# Patient Record
Sex: FEMALE | Race: WHITE
[De-identification: names, ages, dates, MRNs, and addresses within clinical notes are randomized per-mention and may not be internally consistent; named-entity substitution may affect disease eponyms.]

---

## 2018-04-14 ENCOUNTER — HOSPITAL ENCOUNTER (EMERGENCY)
Dept: HOSPITAL 25 - ED | Age: 68
Discharge: HOME | End: 2018-04-14
Payer: COMMERCIAL

## 2018-04-14 VITALS — DIASTOLIC BLOOD PRESSURE: 90 MMHG | SYSTOLIC BLOOD PRESSURE: 121 MMHG

## 2018-04-14 DIAGNOSIS — L03.011: Primary | ICD-10-CM

## 2018-04-14 PROCEDURE — 99282 EMERGENCY DEPT VISIT SF MDM: CPT

## 2018-04-14 NOTE — ED
Rosalinda RODRIGUEZ Emily, scribed for Hunter Malhotra MD on 18 at 1709 .





Upper Extremity Pain





- HPI Summary


HPI Summary: 


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 

complaint of R thumb pain that began 4 days ago, thinks a "hair sliver" entered 

the skin and now inflamed, swollen and tender. The patient rates the pain 5/10 

in severity. Symptoms aggravated by nothing. Symptoms alleviated by nothing. 

Patient reports edema and erythema. Pt reports that cut hair gets between her 

nail and the nail bed.








- History of Current Complaint


Chief Complaint: EDExtremityUpper


Stated Complaint: RT THUMB SWELLING


Hx Obtained From: Patient


Onset/Duration: Started Days Ago, Still Present


Timing: Constant, Lasting Days


Severity Initially: Moderate


Severity Currently: Moderate


Pain Location: Finger


Aggravating Factor(s): Nothing


Alleviating Factor(s): Nothing


Associated Signs & Symptoms: Positive: Swelling, Redness





- Allergies/Home Medications


Allergies/Adverse Reactions: 


 Allergies











Allergy/AdvReac Type Severity Reaction Status Date / Time


 


No Known Allergies Allergy   Verified 18 15:09














PMH/Surg Hx/FS Hx/Imm Hx


Previously Healthy: No


Opthamlomology History: 


   Denies: Hx Legally Blind


EENT History: 


   Denies: Hx Deafness





- Surgical History


Surgery Procedure, Year, and Place: CHOLECYSTECTOMY, 


Infectious Disease History: No


Infectious Disease History: 


   Denies: Traveled Outside the [place] in Last 30 Days





- Family History


Known Family History: Positive: Other


Family History: NON CONTRIBUTORY





- Social History


Occupation: Employed Full-time


Lives: Alone


Alcohol Use: Occasionally


Substance Use Type: Reports: None


Smoking Status (MU): Never Smoked Tobacco





Review of Systems


Positive: Edema, Other - Positive R thumb pain


Positive: Other - Positive erythema


All Other Systems Reviewed And Are Negative: Yes





Physical Exam





- Summary


Physical Exam Summary: 


Appearance: Well-appearing, Well-nourished


Skin: Warm. Right lateral thumb is mildly tender to palpation. Erythematous. 

Without fluctuance.


Eyes: Normal


ENT: Normal


Neck: Supple, nontender


Respiratory: Clear to auscultation


Cardiovascular: Regular rate, regular rhythm. Normal S1, S2.


Abdomen: Soft, nontender


Musculoskeletal: Normal, Strength/ROM Intact


Neurological: Normal, A&Ox3


Psychiatric: Normal


General: No acute distress





Triage Information Reviewed: Yes


Vital Signs On Initial Exam: 


 Initial Vitals











Temp Pulse Resp BP Pulse Ox


 


 97.7 F   81   17   125/98   97 


 


 18 15:06  18 15:06  18 15:06  18 15:06  18 15:06











Vital Signs Reviewed: Yes





Diagnostics





- Vital Signs


 Vital Signs











  Temp Pulse Resp BP Pulse Ox


 


 18 15:06  97.7 F  81  17  125/98  97














- Laboratory


Lab Statement: Any lab studies that have been ordered have been reviewed, and 

results considered in the medical decision making process.





Course/Dx





- Course


Course Of Treatment: felon of right thumb- PO abx [id number]





- Diagnoses


Provider Diagnoses: 


 Felon of finger of right hand








Discharge





- Sign-Out/Discharge


Documenting (check all that apply): Discharge





- Discharge Plan


Condition: Stable


Disposition: HOME


Prescriptions: 


Cephalexin CAP* [Keflex CAP*] 500 mg PO TID 10 Days #30 cap


Patient Education Materials:  Paronychia (ED)


Referrals: 


Constantine Trevino MD [Primary Care Provider] - 


Additional Instructions: 


take antibiotics as directed, then go to urgent care if pain worsens in 3-4 

days ON the antibiotics





- Billing Disposition and Condition


Condition: STABLE


Disposition: HOME





The documentation as recorded by the Rosalinda rogers Emily accurately reflects the 

service I personally performed and the decisions made by me, Hunter Malhotra MD.

## 2018-10-16 ENCOUNTER — HOSPITAL ENCOUNTER (EMERGENCY)
Dept: HOSPITAL 25 - ED | Age: 68
LOS: 1 days | Discharge: HOME | End: 2018-10-17
Payer: MEDICARE

## 2018-10-16 DIAGNOSIS — R42: ICD-10-CM

## 2018-10-16 DIAGNOSIS — Z87.891: ICD-10-CM

## 2018-10-16 DIAGNOSIS — H81.10: Primary | ICD-10-CM

## 2018-10-16 PROCEDURE — 99282 EMERGENCY DEPT VISIT SF MDM: CPT

## 2018-10-17 VITALS — DIASTOLIC BLOOD PRESSURE: 76 MMHG | SYSTOLIC BLOOD PRESSURE: 102 MMHG

## 2018-10-17 NOTE — ED
Neurological HPI





- HPI Summary


HPI Summary: 





The pt is a 67 y.o female presenting to the [facility] with a chief complaint of 

dizziness. The pt describes the symptoms as "room spinning" and states she has 

vertigo as well. She has had these symptoms previously but the most recent 

episode was significantly longer as per pt report. The pt states that the 

episode lasted 4 hours. During the episode she reports N/V. Other dizziness 

episodes were intermittent and short while most recent one was described to be 

"constant." She denies fever and other "cold-like" symptoms. The onset of the 

episode was stated to be in the morning.








- History of Current Complaint


Chief Complaint: EDDizziness


Stated Complaint: DIZZINESS


Time Seen by Provider: 10/17/18 02:30


Hx Obtained From: Patient


Pain Intensity: 0


Pain Scale Used: 0-10 Numeric


Character: Room Spinning, Dizzy





- Allergy/Home Medications


Allergies/Adverse Reactions: 


 Allergies











Allergy/AdvReac Type Severity Reaction Status Date / Time


 


No Known Allergies Allergy   Verified 10/16/18 23:32














PMH/Surg Hx/FS Hx/Imm Hx


Sensory History: 


   Denies: Hx Legally Blind, Hx Deafness


Opthamlomology History: 


   Denies: Hx Legally Blind





- Surgical History


Surgery Procedure, Year, and Place: CHOLECYSTECTOMY, 


Infectious Disease History: No


Infectious Disease History: 


   Denies: Traveled Outside the [place] in Last 30 Days





- Family History


Known Family History: Positive: Other


Family History: NON CONTRIBUTORY





- Social History


Alcohol Use: Weekly


Substance Use Type: Reports: None


Smoking Status (MU): Former Smoker





Review of Systems


Negative: Fever


Eyes: Negative


ENT: Negative


Cardiovascular: Negative


Respiratory: Negative


Positive: Vomiting, Nausea


Genitourinary: Negative


Musculoskeletal: Negative


Skin: Negative


Neurological: Other - Dizziness/room-spinning


Psychological: Normal


All Other Systems Reviewed And Are Negative: Yes





Physical Exam





- Summary


Physical Exam Summary: 





VITAL SIGNS: Reviewed.


GENERAL: Patient is a well-developed and nourished (FEMALE) who is lying 

comfortable in the stretcher. Patient is not in any acute respiratory distress.


HEAD AND FACE: No signs of trauma. No ecchymosis, hematomas or skull 

depressions. No sinus tenderness.


EYES: PERRLA, EOMI x 2, No injected conjunctiva, no nystagmus.


EARS: Hearing grossly intact. Ear canals and tympanic membranes are within 

normal limits.


MOUTH: Oropharynx within normal limits.


NECK: Supple, trachea is midline, no adenopathy, no JVD, no carotid bruit, no c-

spine tenderness, neck with full ROM.


CHEST: Symmetric, no tenderness at palpation


LUNGS: Clear to auscultation bilaterally. No wheezing or crackles.


CVS: Regular rate and rhythm, S1 and S2 present, no murmurs or gallops 

appreciated.


ABDOMEN: Soft, non-tender. No signs of distention. No rebound no guarding, and 

no masses palpated. Bowel sounds are normal.


EXTREMITIES: FROM in all major joints, no edema, no cyanosis or clubbing.


NEURO: Alert and oriented x 3. No acute neurological deficits. Speech is normal 

and follows commands.


SKIN: Dry and warm


Triage Information Reviewed: Yes


Vital Signs On Initial Exam: 


 Initial Vitals











Temp Pulse Resp BP Pulse Ox


 


 97.4 F   68   16   141/67   96 


 


 10/16/18 23:30  10/16/18 23:30  10/16/18 23:30  10/16/18 23:30  10/16/18 23:30











Vital Signs Reviewed: Yes





Diagnostics





- Vital Signs


 Vital Signs











  Temp Pulse Resp BP Pulse Ox


 


 10/17/18 01:31  97.2 F  66  16  147/73  99


 


 10/16/18 23:30  97.4 F  68  16  141/67  96














- Laboratory


Lab Statement: Any lab studies that have been ordered have been reviewed, and 

results considered in the medical decision making process.





Course/Dx





- Course


Course Of Treatment: The pt is a 67 y.o female with a chief complaint of 

dizziness. [facility] [place] [facility] 

as per ED Physician. Upon review of physical examination, the pt dx will be 

benign positional veritgo. We discussed discharge plan with the pt and 

recommended taking baclofin as needed and during onset of symptoms.





- Diagnoses


Provider Diagnoses: 


 Benign positional vertigo








Discharge





- Sign-Out/Discharge


Documenting (check all that apply): Patient Departure - Discharge Home





- Discharge Plan


Condition: Stable


Disposition: HOME


Prescriptions: 


Meclizine HCl [Motion Sickness II] 25 mg PO TID PRN #30 tablet


 PRN Reason: Dizziness


Patient Education Materials:  Benign Paroxysmal Positional Vertigo (ED)


Referrals: 


Constantine Trevino MD [Primary Care Provider] - 


Additional Instructions: 


RETURN TO THE EMERGENCY DEPARTMENT FOR CHANGING OR WORSENING SYMPTOMS. FOLLOW 

UP WITH PCP IN 1-2 DAYS.





Take Medication when needed.





- Attestation Statements


Document Initiated by Scribe: Yes


Documenting Scribe: Tyron Amaya


Provider For Whom Scribe is Documenting (Include Credential): Dr. Young Mayes


Scribe Attestation: 


Tyron RODRIGUEZ, scribed for Dr. Young Mayes on 10/17/18 at 0605.

## 2019-09-03 ENCOUNTER — HOSPITAL ENCOUNTER (EMERGENCY)
Dept: HOSPITAL 25 - ED | Age: 69
Discharge: HOME | End: 2019-09-03
Payer: MEDICARE

## 2019-09-03 VITALS — SYSTOLIC BLOOD PRESSURE: 141 MMHG | DIASTOLIC BLOOD PRESSURE: 72 MMHG

## 2019-09-03 DIAGNOSIS — N20.0: Primary | ICD-10-CM

## 2019-09-03 DIAGNOSIS — K57.30: ICD-10-CM

## 2019-09-03 DIAGNOSIS — Z79.899: ICD-10-CM

## 2019-09-03 DIAGNOSIS — R16.1: ICD-10-CM

## 2019-09-03 DIAGNOSIS — Z87.891: ICD-10-CM

## 2019-09-03 DIAGNOSIS — Z90.49: ICD-10-CM

## 2019-09-03 LAB
ALBUMIN SERPL BCG-MCNC: 4 G/DL (ref 3.2–5.2)
ALBUMIN/GLOB SERPL: 1.4 {RATIO} (ref 1–3)
ALP SERPL-CCNC: 141 U/L (ref 34–104)
ALT SERPL W P-5'-P-CCNC: 19 U/L (ref 7–52)
ANION GAP SERPL CALC-SCNC: 7 MMOL/L (ref 2–11)
AST SERPL-CCNC: 21 U/L (ref 13–39)
BASOPHILS # BLD AUTO: 0.1 10^3/UL (ref 0–0.2)
BUN SERPL-MCNC: 17 MG/DL (ref 6–24)
BUN/CREAT SERPL: 19.1 (ref 8–20)
CALCIUM SERPL-MCNC: 8.7 MG/DL (ref 8.6–10.3)
CHLORIDE SERPL-SCNC: 109 MMOL/L (ref 101–111)
EOSINOPHIL # BLD AUTO: 0.4 10^3/UL (ref 0–0.6)
GLOBULIN SER CALC-MCNC: 2.8 G/DL (ref 2–4)
GLUCOSE SERPL-MCNC: 137 MG/DL (ref 70–100)
HCO3 SERPL-SCNC: 25 MMOL/L (ref 22–32)
HCT VFR BLD AUTO: 43 % (ref 35–47)
HGB BLD-MCNC: 14.6 G/DL (ref 12–16)
LYMPHOCYTES # BLD AUTO: 4.2 10^3/UL (ref 1–4.8)
MCH RBC QN AUTO: 31 PG (ref 27–31)
MCHC RBC AUTO-ENTMCNC: 34 G/DL (ref 31–36)
MCV RBC AUTO: 92 FL (ref 80–97)
MONOCYTES # BLD AUTO: 0.9 10^3/UL (ref 0–0.8)
NEUTROPHILS # BLD AUTO: 3.1 10^3/UL (ref 1.5–7.7)
NRBC # BLD AUTO: 0 10^3/UL
NRBC BLD QL AUTO: 0
PLATELET # BLD AUTO: 144 10^3/UL (ref 150–450)
POTASSIUM SERPL-SCNC: 3.2 MMOL/L (ref 3.5–5)
PROT SERPL-MCNC: 6.8 G/DL (ref 6.4–8.9)
RBC # BLD AUTO: 4.72 10^6 /UL (ref 3.7–4.87)
RBC UR QL AUTO: (no result)
SODIUM SERPL-SCNC: 141 MMOL/L (ref 135–145)
WBC # BLD AUTO: 8.7 10^3/UL (ref 3.5–10.8)
WBC UR QL AUTO: (no result)

## 2019-09-03 PROCEDURE — 99285 EMERGENCY DEPT VISIT HI MDM: CPT

## 2019-09-03 PROCEDURE — 96375 TX/PRO/DX INJ NEW DRUG ADDON: CPT

## 2019-09-03 PROCEDURE — 36415 COLL VENOUS BLD VENIPUNCTURE: CPT

## 2019-09-03 PROCEDURE — 81003 URINALYSIS AUTO W/O SCOPE: CPT

## 2019-09-03 PROCEDURE — 85025 COMPLETE CBC W/AUTO DIFF WBC: CPT

## 2019-09-03 PROCEDURE — 87086 URINE CULTURE/COLONY COUNT: CPT

## 2019-09-03 PROCEDURE — 96376 TX/PRO/DX INJ SAME DRUG ADON: CPT

## 2019-09-03 PROCEDURE — 80053 COMPREHEN METABOLIC PANEL: CPT

## 2019-09-03 PROCEDURE — 96361 HYDRATE IV INFUSION ADD-ON: CPT

## 2019-09-03 PROCEDURE — 74176 CT ABD & PELVIS W/O CONTRAST: CPT

## 2019-09-03 PROCEDURE — 81015 MICROSCOPIC EXAM OF URINE: CPT

## 2019-09-03 PROCEDURE — 96374 THER/PROPH/DIAG INJ IV PUSH: CPT

## 2019-09-03 NOTE — XMS REPORT
Summary of Care

 Created on:2019



Patient:Stephenie Suarez

Sex:Female

:1950

External Reference #:[id number]





Demographics







 Address  [address]



   [place] [address]

 

 Home Phone  [phone]

 

 Mobile Phone  [phone]

 

 Preferred Language  English

 

 Marital Status  Not  or 

 

 Faith Affiliation  Unknown

 

 Race  White

 

 Ethnic Group  Not  or 









Author







 Organization  [facility]

 

 Address  [address]



   [place] [address]









Support







 Name  Relationship  Address  Phone

 

 Ciera Jimenez  Unavailable  Unavailable  [phone]









Care Team Providers







 Name  Role  Phone

 

 Sruthi Godfery MD  Primary Care Provider  [phone]









Reason for Referral

MRI/CAT/PET Scan (Routine)





 Status  Reason  Specialty  Diagnoses / Procedures  Referred By  Referred To



         Contact  Contact

 

 Authorized      Diagnoses



Low back pain without sciatica, unspecified back pain laterality, unspecified 
chronicity  Sruthi Godfrey MD



  



       



Procedures



US RETROPERITONEAL LIMITED   [facility]



  



         [place] [address]



  



         Phone:  



         [phone]



  



         Fax: [phone]  





MRI/CAT/PET Scan (Routine)





 Status  Reason  Specialty  Diagnoses /  Referred By  Referred To



       Procedures  Contact  Contact

 

 Pending Review      Diagnoses



Low back pain without sciatica, unspecified back pain laterality, unspecified 
chronicity  Sruthi Godfrey MD



  



       



Procedures



CT CHEST WITHOUT IV CONTRAST   [facility]



  



         [place] [id number]



  



         Phone:  



         [phone]



  



         Fax: [phone]  









Reason for Visit







 Reason  Comments

 

 Back Pain  f/u, saw Jennifer. Pain is episodic. Gets worse at times. Today pain 
is



   better. Staurday it woke her up. Doesn't feel like a disc problem.







Encounter Details







 Date  Type  Department  Care Team  Description

 

 2019  Office Visit  [facility]  Sruthi Godfrey MD



  Low back pain without sciatica, unspecified back pain laterality, unspecified 
chronicity (Primary Dx);



     Medicine



   [facility]



  Need for hepatitis C screening test



     [address] [place] [address]



  



     [place] [address]



  [phone] (Fax)  







Allergies

No Known Allergiesdocumented as of this encounter (statuses as of 2019)



Medications







 Medication  Sig  Dispensed  Refills  Start Date  End Date  Status

 

 IBUPROFEN 200 PO  Take 600 mg by mouth    0      Active



   DAILY.          



documented as of this encounter (statuses as of 2019)



Active Problems







 Problem  Noted Date

 

 BMI 32.0-32.9,adult  2017



documented as of this encounter (statuses as of 2019)



Social History







 Tobacco Use  Types  Packs/Day  Years Used  Date

 

 Never Smoker        









 Smokeless Tobacco: Never Used      









 Alcohol Use  Drinks/Week  oz/Week  Comments

 

 Yes  2 Glasses of wine  2.0  ocassional









 Sex Assigned at Birth  Date Recorded

 

 Not on file  









 Job Start Date  Occupation  Industry

 

 Not on file  Not on file  Not on file









 Travel History  Travel Start  Travel End









 No recent travel history available.



documented as of this encounter



Last Filed Vital Signs







 Vital Sign  Reading  Time Taken  Comments

 

 Blood Pressure  124/62  2019 10:39 AM EDT  

 

 Pulse  60  2019 10:39 AM EDT  

 

 Temperature  -  -  

 

 Respiratory Rate  -  -  

 

 Oxygen Saturation  95%  2019 10:39 AM EDT  

 

 Inhaled Oxygen Concentration  -  -  

 

 Weight  100.2 kg (221 lb)  2019 10:39 AM EDT  

 

 Height  170.2 cm (5' 7")  2019 10:39 AM EDT  

 

 Body Mass Index  34.61  2019 10:39 AM EDT  



documented in this encounter



Progress Notes

Sruthi Godfrey MD - 2019 10:40 AM EDTFormatting of this note might be 
different from the original.

NAME:Stephenie Suarez

MRN: [id number]

1950: 1950

ENC Date: 2019



CC:

Chief Complaint

Patient presents with

 Back Pain

  f/u, saw Jennifer. Pain is episodic. Gets worse at times. Today pain is 
better. Staurday it woke her up. Doesn't feel like a disc problem.



Stephenie Suarez is a 68-y.o. female

Presented with 2 weeks of low back pain to Giovanna Rodriguez NP and now again 12 
days later-



May have had back pain in the past-  But not recently

thisis a different pain -  Pain radiates into the stomach -

Started mid back and then to the left side-

Pain comes and goes-

Standing is the best -  Sitting worse-   laying down ok

Better with ibuprofen -

Thought may be related to kidneys or bladder -



Was so bad Saturday night thought to go to the hospital -



Current Outpatient Medications

Medication Sig

 IBUPROFEN 200 PO Take 600 mg by mouth DAILY.



No current facility-administered medications for this visit.



Patient Active Problem List

 Diagnosis Date Noted

 BMI 32.0-32.9,adult 2017



Family History

Problem Relation Age of Onset

 Diabetes Mother

 Cancer Mother



No cardiopulmonary symptoms   No upper or lower GI complaints    No urinary 
tract symptoms.  No bruising/ bleeding. No neurological complaints .  No 
insomnia.+ .

Social History



Tobacco Use

 Smoking status: Never Smoker

 Smokeless tobacco: Never Used

Substance Use Topics

 Alcohol use: Yes

  Alcohol/week: 2.0 standard drinks

  Types: 2 Glasses of wine per week

  Comment: ocassional

 Drug use: Not on file



OBJECTIVE:

/62  | Pulse 60  | Ht 5' 7" (1.702 m)  | Wt 221 lb (100.2 kg)  | SpO2 95%
  | BMI 34.61 kg/m .

Heent  neg

Neck no JVD, thyromegaly or bruit

Lungs Clear

CV rrr

Abd soft, nontender, no organomegaly

Ext no edema; no lesions; pulses intact

Neuro: intellect intact ; motor including gait unremarkable



A/P

  ICD-9-CM ICD-10-CM

1. Low back pain without sciatica, unspecified back pain laterality, 
unspecified chronicity 724.2 M54.5 URINE DIP MANUAL (AMB POCT)



There are no Patient Instructions on file for this visit.



AUTHOR: Sruthi Godfrey MD 11:03 2019Electronically signed by Sruthi Godfrey MD at 2019 11:37 AM EDTdocumented in this encounter



Plan of Treatment







 Name  Type  Priority  Associated Diagnoses  Order Schedule

 

 CBC WITH DIFFERENTIAL  Lab  Routine  Low back pain without  Expected:



       sciatica, unspecified  2019



       back pain laterality,  (Approximate),



       unspecified chronicity  Expires: 2020

 

 COMPREHENSIVE METABOLIC  Lab  Routine  Low back pain without  Expected:



 PANEL      sciatica, unspecified  2019



       back pain laterality,  (Approximate),



       unspecified chronicity  Expires: 2020

 

 CT CHEST WITHOUT IV  Imaging  Routine  Low back pain without  Expected:



 CONTRAST      sciatica, unspecified  2019,



       back pain laterality,  Expires: 2020



       unspecified chronicity  

 

 US RETROPERITONEAL LIMITED  Imaging  Routine  Low back pain without  Expected:



       sciatica, unspecified  2019,



       back pain laterality,  Expires: 2020



       unspecified chronicity  









 Health Maintenance  Due Date  Last Done  Comments

 

 MEDICARE ANNUAL WELLNESS VISIT  1950    

 

 HIV SCREENING  1966    

 

 HEPATITIS C SCREENING  1991    

 

 COLONOSCOPY SCREENING  2001    

 

 ZOSTER IMMUNIZATION SERIES (1  2001    



 of 2)      

 

 FALL RISK ASSESSMENT  2016    

 

 OSTEOPOROSIS SCREENING  2016    

 

 PNEUMOCOCCAL 65+YRS (1 of 2 -  2016    



 PCV13)      

 

 INFLUENZA VACCINE (#1)  2019    

 

 DIABETES SCREENING  2020,  



     2017  

 

 MAMMOGRAM (SCREENING)  2020,  



     08/15/2017  

 

 DEPRESSION SCREENING  2020  

 

 LIPID DISORDER SCREENING  2024,  



     2017  

 

 HPV IMMUNIZATION SERIES  Aged Out    No longer eligible based



       on patient's age to



       complete this topic

 

 MENINGOCOCCAL VACCINE IMM  Aged Out    No longer eligible based



       on patient's age to



       complete this topic



documented as of this encounter



Procedures







 Procedure Name  Priority  Date/Time  Associated Diagnosis  Comments

 

 URINE DIP MANUAL  Routine  2019 10:48 AM  Low back pain  Results for this



 (AMB POCT)    EDT  without sciatica,  procedure are in



       unspecified back  the results



       pain laterality,  section.



       unspecified  



       chronicity  



documented in this encounter



Results

URINE DIP MANUAL (AMB POCT) (2019 10:48 AM EDT)





 Component  Value  Ref Range  Performed At  Pathologist



         Signature

 

 URINE GLUCOSE (POCT)  Negative  Negative mg/dl  [facility] POCT  

 

 URINE BILIRUBIN  Negative  Negative  [facility]  



 (POCT)      [place] POCT  

 

 Urine Ketones (POCT)  Negative  Negative  [facility] POCT  

 

 URINE SPECIFIC  1.020  1.005 - 1.030  [facility]  



 GRAVITY (POCT)      [place] POCT  

 

 URINE BLOOD (POCT)  Trace-Intact (A)  Negative  [facility] POCT  

 

 URINE PH (POCT)  6.0  5.0 - 8.0  [facility] POCT  

 

 URINE PROTEIN (POCT)  Negative  Negative mg/dl  [facility] POCT  

 

 URINE UROBILINOGEN  0.2  0.2 - 1.0 mg/dl  [facility]  



 (POCT)      [place] POCT  

 

 URINE NITRITES  Negative  Negative  [facility]  



 (POCT)      [place] POCT  

 

 URINE LEUKOCYTES  Negative  Negative  [facility]  



 (POCT)    Cells/uL  [place] POCT  









 Specimen

 

 Urine









 Performing Organization  Address  City/State/Zipcode  Phone Number

 

 [facility] POCT  [address] [place] [address]  



documented in this encounter



Visit Diagnoses







 Diagnosis

 

 Low back pain without sciatica, unspecified back pain laterality, unspecified



 chronicity - Primary

 

 Need for hepatitis C screening test







 Special screening examination for other specified viral diseases



documented in this encounter



Insurance







 Payer  Benefit Plan /  Subscriber ID  Effective Dates  Phone  Address  Type



   Group          

 

 [employer]  xxxxxxxxxxxx  2016-Presen      [employer] BL[employer]    t      



   PPO ([phone])          









 Guarantor Name  Account Type  Relation to  Date of  Phone  Billing Address



     Patient  Birth    

 

 Stephenie Suarez  Personal/Famil    1950  [phone]  [address]      (Home)



  [address]







         [phone]  [facility]



         (Work)  [place] [address]



documented as of this encounter

## 2019-09-03 NOTE — ED
Back Pain





- HPI Summary


HPI Summary: 





Pt is a 69 y/o F presenting to the ED with a chief complaint of L lower back 

pain initially onset about 1 month ago. She states that the pain waxes and wanes

, but tonight she woke up in the middle of the night due to extreme increase in 

severity. She also notes N/V.





- History of Current Complaint


Chief Complaint: EDFlankPain


Stated Complaint: FLANK PAIN PER PT


Time Seen by Provider: 19 01:27


Hx Obtained From: Patient


Onset/Duration: Gradual Onset, Lasting Weeks, Still Present, Worse Since - 

tonight


Onset/Duration: Started Weeks Ago, Still Present


Timing: Intermittent, Lasting Hours


Back Pain Location: Is Discrete @ - L lower back


Severity Initially: Moderate


Severity Currently: Severe


Pain Intensity: 10


Pain Scale Used: 0-10 Numeric


Character: Sharp


Aggravating Symptom(s): Nothing


Alleviating Symptom(s): Nothing


Associated Signs And Symptoms: Positive: Negative





- Allergies/Home Medications


Allergies/Adverse Reactions: 


 Allergies











Allergy/AdvReac Type Severity Reaction Status Date / Time


 


No Known Allergies Allergy   Verified 19 01:21














PMH/Surg Hx/FS Hx/Imm Hx


Previously Healthy: Yes


Endocrine/Hematology History: 


   Denies: Hx Diabetes


 History: 


   Denies: Hx Kidney Stones


Sensory History: 


   Denies: Hx Legally Blind, Hx Deafness


Opthamlomology History: 


   Denies: Hx Legally Blind





- Surgical History


Surgery Procedure, Year, and Place: CHOLECYSTECTOMY, 


Infectious Disease History: No


Infectious Disease History: 


   Denies: Traveled Outside the [place] in Last 30 Days





- Family History


Known Family History: 


   Negative: Diabetes





- Social History


Alcohol Use: Weekly


Hx Substance Use: No


Substance Use Type: Reports: None


Hx Tobacco Use: Yes


Smoking Status (MU): Former Smoker





Review of Systems


Positive: Vomiting, Nausea


Positive: Myalgia - flank pain, L


All Other Systems Reviewed And Are Negative: Yes





Physical Exam





- Summary


Physical Exam Summary: 





Appearance: Somewhat obese elderly woman who appears somewhat colicky.


Skin: Warm, dry, no obvious rash


Eyes: sclera anicteric, no conjunctival pallor


ENT: mucous membranes moist, pharynx appears normal


Neck: Supple, nontender


Respiratory: Clear to auscultation, no signs of respiratory distress


Cardiovascular: Normal S1, S2. No murmurs. Normal distal pulses in tibial and 

radial bilaterally.


Abdomen: Soft, nontender, normal active bowel sounds present


Musculoskeletal: Normal, Strength/ROM Intact. In her back, there is no CVA 

tenderness and no restriction in ROM of the back.


Neurological: A&Ox3, awake and alert, mentation is normal, speech is fluent and 

appropriate


Psychiatric: affect is normal, does not appear anxious or depressed





Triage Information Reviewed: Yes


Vital Signs On Initial Exam: 


 Initial Vitals











Temp Pulse Resp BP Pulse Ox


 


 96.2 F   112   20   208/125   97 


 


 19 01:20  19 01:20  19 01:20  19 01:20  19 01:20











Vital Signs Reviewed: Yes





Diagnostics





- Vital Signs


 Vital Signs











  Temp Pulse Resp BP Pulse Ox


 


 19 01:20  96.2 F  112  20  208/125  97














- Laboratory


Result Diagrams: 


 19 02:03





 19 02:03


Lab Statement: Any lab studies that have been ordered have been reviewed, and 

results considered in the medical decision making process.





- CT


  ** CT a/p


CT Interpretation Completed By: Radiologist


Summary of CT Findings: 1. Distal left ureteral calculus measuring 5 mm which 

is approximately 1 cm above the left UVJ with secondary obstructive uropathy of 

the left upper tract.  2. Minimal nonobstructing left renal calculi.  3. Mild 

bibasilar interstitial prominence with minimal fibro-atelectatic change.  4. 

Borderline splenomegaly.  5. Status post cholecystectomy.  6. Colonic 

diverticulosis without diverticulitis.  ED physician has reviewed this report.





Re-Evaluation





- Re-Evaluation


  ** 1st re-eval


Re-Evaluation Time: 05:00


Change: Improved


Comment: Pt's pain has significantly improved.





Back Pain Course/Dx





- Course


Course Of Treatment: Pt is a 69 y/o F presenting to the ED with a chief 

complaint of L lower back pain that has been waxing and waning for one month, 

but woke her up tonight d/t severe pain. She also notes N/V.  On exam, there is 

no CVA tenderness and no restriction of ROM. She is a somewhat obese elderly 

woman who appears somewhat colicky.  CT a/p shows:  1. Distal left ureteral 

calculus measuring 5 mm which is approximately 1 cm above the left UVJ with 

secondary obstructive uropathy of the left upper tract.  2. Minimal 

nonobstructing left renal calculi.  3. Mild bibasilar interstitial prominence 

with minimal fibro-atelectatic change.  4. Borderline splenomegaly.  5. Status 

post cholecystectomy.  6. Colonic diverticulosis without diverticulitis.  The 

pt will be d/c'ed with dx of kidney stones and instructed to follow up with Dr. Mota of urology. She is stable and agreeable with this plan.





- Diagnoses


Provider Diagnoses: 


 Kidney stones








Discharge ED





- Sign-Out/Discharge


Documenting (check all that apply): Patient Departure


Patient Received Moderate/Deep Sedation with Procedure: No





- Discharge Plan


Condition: Improved


Disposition: HOME


Prescriptions: 


Ondansetron ODT TAB* [Zofran 4 MG Odt TAB*] 8 mg PO Q6H PRN #12 tab.odt


 PRN Reason: Nausea


oxyCODONE/Acetamin 5/325 MG* [Percocet 5/325 TAB*] 2 tab PO Q4H PRN #15 tab MDD 

6


 PRN Reason: Pain - Moderate


Prochlorperazine SUPP* [Compazine Supp*] 25 mg .SEE ORDER Q6H PRN #8 supp


 PRN Reason: Nausea


Patient Education Materials:  Kidney Stones (ED)


Referrals: 


Samir Mota MD [Medical Doctor] - 


Additional Instructions: 


Contact the urologist's office this morning to set up followup. They deal with 

this problem frequently and can best advise you what the next step will be. 

Take the medications prescribed to control the pain and nausea. If you cannot 

control your symptoms, come back to the ED and we will help.





- Attestation Statements


Document Initiated by Leigh: Yes


Documenting Scribe: Pura Malin


Provider For Whom Leigh is Documenting (Include Credential): Bj Egan MD.


Scribe Attestation: 


Pura RODRIGUEZ, cedricked for Bj Egan MD. on 19 at 0542. 


Status of Scribe Document: Ready

## 2019-09-03 NOTE — XMS REPORT
Summary of Care

 Created on:2019



Patient:Stephenie Suarez

Sex:Female

:1950

External Reference #:[id number]





Demographics







 Address  [address]



   [place] [address]

 

 Home Phone  [phone]

 

 Mobile Phone  [phone]

 

 Preferred Language  English

 

 Marital Status  Not  or 

 

 Jew Affiliation  Unknown

 

 Race  White

 

 Ethnic Group  Not  or 









Author







 Organization  [facility]

 

 Address  [address]



   [place] [address]









Support







 Name  Relationship  Address  Phone

 

 Ciera Jimenez  Unavailable  Unavailable  [phone]









Care Team Providers







 Name  Role  Phone

 

 Sruthi Godfrey MD  Primary Care Provider  [phone]









Reason for Visit







 Reason  Comments

 

 Check Up  lower back pain only gets better when in bed







Encounter Details







 Date  Type  Department  Care Team  Description

 

 2019  Office Visit  GoesselGiovanna Roque,  Acute midline low 
back pain without sciatica (Primary Dx);



     Practice



  FNP



  Hematuria, unspecified type



     [address]



  [address] [place], [place] [address] [place] [address]



  



     [phone] (Fax)  







Allergies

No Known Allergiesdocumented as of this encounter (statuses as of 2019)



Medications







 Medication  Sig  Dispensed  Refills  Start Date  End Date  Status

 

 IBUPROFEN 200 PO  Take 600 mg by mouth    0      Active



   DAILY.          



documented as of this encounter (statuses as of 2019)



Active Problems







 Problem  Noted Date

 

 BMI 32.0-32.9,adult  2017



documented as of this encounter (statuses as of 2019)



Social History







 Tobacco Use  Types  Packs/Day  Years Used  Date

 

 Never Smoker        









 Smokeless Tobacco: Never Used      









 Alcohol Use  Drinks/Week  oz/Week  Comments

 

 Yes  2 Glasses of wine  2.0  ocassional









 Sex Assigned at Birth  Date Recorded

 

 Not on file  









 Job Start Date  Occupation  Industry

 

 Not on file  Not on file  Not on file









 Travel History  Travel Start  Travel End









 No recent travel history available.



documented as of this encounter



Last Filed Vital Signs







 Vital Sign  Reading  Time Taken  Comments

 

 Blood Pressure  124/78  2019  2:25 PM  



     EDT  

 

 Pulse  80  2019  2:25 PM  



     EDT  

 

 Temperature  -  -  

 

 Respiratory Rate  -  -  

 

 Oxygen Saturation  95%  2019  2:25 PM  



     EDT  

 

 Inhaled Oxygen Concentration  -  -  

 

 Weight  100.2 kg (220 lb 12.8 oz)  2019  2:25 PM  



     EDT  

 

 Height  170.2 cm (5' 7")  2019  2:25 PM  



     EDT  

 

 Body Mass Index  34.58  2019  2:25 PM  



     EDT  



documented in this encounter



Patient Instructions

Patient InstructionsGiovanna Rodriguez FNP - 2019  2:20 PM EDTHeat or ice 
as needed

Advil 2-3 tabs up to 3 times a day OR Aleve 1-2 tabs twice a day as needed -  
EAT FIRST

Xrays pending review

Consider referral to Dr TaylorElectronically signed by Giovanna Rodriguez FNP at 
2019  3:24 PM EDT

documented in this encounter



Progress Notes

Giovanna Rodriguez FNP - 2019  2:20 PM EDTFormatting of this note might be 
different from the original.

PATIENT:  Stephenie Suarez

MRN:  [id number]

:  1950

DATE OF SERVICE:  2019



CHIEF COMPLAINT:

Chief Complaint

Patient presents with

 Check Up

  lower back pain only gets better when in bed



Subjective

HISTORY OF PRESENT ILLNESS:

Stephenie Suarez is a 68-y.o. female.

HPI

LBP x 2 weeks - tried increased fluids - helps.



Past Medical History:

Diagnosis Date

 Postmenopausal

 Pregnancy, first



Family History

Problem Relation Age of Onset

 Diabetes Mother

 Cancer Mother



Current Outpatient Medications

Medication Sig

 IBUPROFEN 200 PO Take 600 mg by mouth DAILY.



No current facility-administered medications for this visit.



No Known Allergies

Social History



Socioeconomic History

 Marital status: Single

  Spouse name: Not on file

 Number of children: Not on file

 Years of education: Not on file

 Highest education level: Not on file

Occupational History

 Not on file

Social Needs

 Financial resource strain: Not on file

 Food insecurity:

  Worry: Not on file

  Inability: Not on file

 Transportation needs:

  Medical: Not on file

  Non-medical: Not on file

Tobacco Use

 Smoking status: Never Smoker

 Smokeless tobacco: Never Used

Substance and Sexual Activity

 Alcohol use: Yes

  Alcohol/week: 2.0 standard drinks

  Types: 2 Glasses of wine per week

  Comment: ocassional

 Drug use: Not on file

 Sexual activity: Not on file

Lifestyle

 Physical activity:

  Days per week: Not on file

  Minutes per session: Not on file

 Stress: Not on file

Relationships

 Social connections:

  Talks on phone: Not on file

  Gets together: Not on file

  Attends Sabianist service: Not on file

  Active member of club or organization: Not on file

  Attends meetings of clubs or organizations: Not on file

  Relationship status: Not on file

 Intimate partner violence:

  Fear of current or ex partner: Not on file

  Emotionally abused: Not on file

  Physically abused: Not on file

  Forced sexual activity: Not on file

Other Topics Concern

 Back Care Not Asked

 Bike Helmet Not Asked

 Blood Transfusions Not Asked

 Caffeine Concern Not Asked

 Exercise Not Asked

 Hobby Hazards Not Asked

 International Travel Not Asked

  Service Not Asked

 Occupational Exposure Not Asked

 Seat Belt Not Asked

 Self-Exams Not Asked

 Sleep Concern Not Asked

 Special Diet Not Asked

 Stress Concern Not Asked

 Weight Concern Not Asked

Social History Narrative

  -



Over the last 2 weeks, have you been feeling down, depressed, anxious, or 
hopeless?: 0

Over the past 2 weeks, have you felt little interest or pleasure in doing things
?: 0



REVIEW OF SYSTEMS:

Review of Systems

Constitutional: Positive for malaise/fatigue. Negative for chills and fever.

Gastrointestinal: Negative for abdominal pain, nausea and vomiting.

Genitourinary: Negative for dysuria, flank pain, frequency, hematuria and 
urgency.

Musculoskeletal: Positive for back pain and myalgias. Negative for falls.

Neurological: Negative for tingling, weakness and headaches.



Objective

PHYSICAL EXAM:

VITALS:  /78 (BP Location: Left arm, Patient Position: Sitting)  | Pulse 
80  | Ht 5' 7" (1.702m)  | Wt 220 lb 12.8 oz (100.2 kg)  | SpO2 95%  | BMI 
34.58 kg/m  Body mass index is 34.58 kg/m.

Physical Exam

Constitutional: She is oriented to person, place, and time. Vital signs are 
normal. She appears well-developed and well-nourished.

HENT:

Head: Normocephalic and atraumatic.

Eyes: Pupils are equal, round, and reactive to light.

Abdominal: Bowel sounds are normal. She exhibits no distension. There is no 
hepatosplenomegaly. There is no tenderness. There is no rigidity and no CVA 
tenderness.

Urine dipstick shows negative for all components, positive for leukocytes, red 
blood cells.



Musculoskeletal: Normal range of motion.

     Lumbar back: She exhibits tenderness. She exhibits normal range of motion 
and no spasm.

     Back:



Xray reviewed - possible DDD - await radiologist report

Neurological: She is alert and oriented to person, place, and time. No cranial 
nerve deficit or sensory deficit. Gait normal.

Skin: Skin is warm and dry.

Vitals reviewed.





ASSESSMENT / IMPRESSION:

  ICD-9-CM ICD-10-CM

1. Acute midline low back pain without sciatica 724.2 M54.5 URINE DIP MANUAL (
AMB POCT)

   XR LUMBAR SPINE MIN 4 VIEWS (STANDARD)

2. Hematuria, unspecified type 599.70 R31.9 URINE CULTURE (C&amp;S)





  Plan

Heat or ice as needed

Advil 2-3 tabs up to 3 times a day OR Aleve 1-2 tabs twice a day as needed -  
EAT FIRST

Xrays pending review

Consider referral to Dr Taylor





Author:  ROSIE Beckman 2019 16:24Electronically signed by Giovanna Rodriguez FNP at 2019  4:24 PM EDTdocumented in this encounter



Plan of Treatment







 Name  Type  Priority  Associated Diagnoses  Date/Time

 

 XR LUMBAR SPINE MIN 4  Imaging  Routine  Acute midline low back  2019  3:
13 PM EDT



 VIEWS (STANDARD)      pain without sciatica  









 Name  Type  Priority  Associated Diagnoses  Order Schedule

 

 XR LUMBAR SPINE MIN  Imaging  Routine  Acute midline low back  1 Occurrences 
starting



 4 VIEWS (STANDARD)      pain without sciatica  2019 until



         2020

 

 URINE CULTURE (C&S)  Lab  Routine  Hematuria, unspecified  1 Occurrences 
starting



       type  2019 until



         02/10/2020









 Health Maintenance  Due Date  Last Done  Comments

 

 MEDICARE ANNUAL WELLNESS VISIT  1950    

 

 HIV SCREENING  1966    

 

 HEPATITIS C SCREENING  1991    

 

 COLONOSCOPY SCREENING  2001    

 

 ZOSTER IMMUNIZATION SERIES (1  2001    



 of 2)      

 

 FALL RISK ASSESSMENT  2016    

 

 OSTEOPOROSIS SCREENING  2016    

 

 PNEUMOCOCCAL 65+YRS (1 of 2 -  2016    



 PCV13)      

 

 INFLUENZA VACCINE (#1)  2019    

 

 DIABETES SCREENING  2020,  



     2017  

 

 MAMMOGRAM (SCREENING)  2020,  



     08/15/2017  

 

 DEPRESSION SCREENING  2020  

 

 LIPID DISORDER SCREENING  2024,  



     2017  

 

 HPV IMMUNIZATION SERIES  Aged Out    No longer eligible based



       on patient's age to



       complete this topic

 

 MENINGOCOCCAL VACCINE IMM  Aged Out    No longer eligible based



       on patient's age to



       complete this topic



documented as of this encounter



Procedures







 Procedure Name  Priority  Date/Time  Associated Diagnosis  Comments

 

 URINE DIP MANUAL  Routine  2019  3:22 PM  Acute midline low  Results for 
this



 (AMB POCT)    EDT  back pain without  procedure are in



       sciatica  the results



         section.



documented in this encounter



Results

URINE DIP MANUAL (AMB POCT) (2019  3:22 PM EDT)





 Component  Value  Ref Range  Performed At  Pathologist



         Signature

 

 URINE GLUCOSE (POCT)  Negative  Negative mg/dl  [facility]  



       [place] PO[place]  

 

 URINE BILIRUBIN  Negative  Negative  [facility]  



 (POCT)      [place]  

 

 Urine Ketones (POCT)  Negative  Negative  [facility]  



       [place]  

 

 URINE SPECIFIC  1.030  1.005 - 1.030  [facility]  



 GRAVITY (POCT)      [place]  

 

 URINE BLOOD (POCT)  Trace-Intact (A)  Negative  [facility] POCT  

 

 URINE PH (POCT)  5.0  5.0 - 8.0  [facility] POCT  

 

 URINE PROTEIN (POCT)  Negative  Negative mg/dl  [facility] POCT  

 

 URINE UROBILINOGEN  0.2  0.2 - 1.0 mg/dl  [facility]  



 (POCT)      [place]  

 

 URINE NITRITES  Negative  Negative  [facility]  



 (POCT)      [place]  

 

 URINE LEUKOCYTES  Moderate (A)  Negative  [facility]  



 (POCT)    Cells/uL  [place] POCT  









 Specimen

 

 Urine









 Performing Organization  Address  City/State/Zipcode  Phone Number

 

 [facility] [place] [address]  [address] [place] [address]  



documented in this encounter



Visit Diagnoses







 Diagnosis

 

 Acute midline low back pain without sciatica - Primary

 

 Hematuria, unspecified type



documented in this encounter



Insurance







 Payer  Benefit Plan /  Subscriber ID  Effective Dates  Phone  Address  Type



   Group          

 

 [employer]  xxxxxxxxxxxx  2016-Presen      [employer]



 ADVANTAGE  MEDICARE BLUE    t      



   PPO ([id number])          









 Guarantor Name  Account Type  Relation to  Date of  Phone  Billing Address



     Patient  Birth    

 

 Stephenie Suarez  Personal/Famil    1950  [phone]  [address]      (Home)



  [address]







         [phone]  [employer]



         (Work)  [place] [address]



documented as of this encounter

## 2019-09-04 NOTE — HP
HISTORY AND PHYSICAL:

 

DATE OF PLANNED ADMISSION/SURGERY:  19

 

HISTORY OF PRESENT ILLNESS:  Mrs. Suarez is a 68-year-old white female who is 
admitted with a left ureteral calculus, for cystoscopy, left ureteroscopy, 
laser lithotripsy, and left ureteral stent insertion.

 

Mrs. Suarez's symptom started several weeks ago when she was having dull pain 
in her left flank.  Three days ago she developed left renal colic and presented 
to the emergency room for evaluation and treatment.  The colic was not 
associated with any fever or chills.  She had a noncontrast CT of the abdomen 
and pelvis, which showed a 5 mm calculus in the distal left ureter associated 
with moderate left hydroureteronephrosis.  The patient was managed 
conservatively with pain medication and fluids and was sent home on oxycodone 
and on antiemetics as needed.

 

Over the last 2 days the patient continued to have recurrent episodes of 
bothersome left flank pain and presented to my office for evaluation.

 

Her past  history is negative.  No past history of any renal diseases or 
calculi. No history of any urinary tract infections or voiding symptoms.

 

PAST MEDICAL HISTORY AND SYSTEM REVIEW:  She is in very good health.

 

SURGICAL HISTORY:  Relevant for a cholecystectomy and a  section.

 

MEDICATIONS:  She is on no chronic medications.

 

ALLERGIES:  She denies any allergies to medications.

 

FAMILY HISTORY:  Negative.

 

SOCIAL HISTORY:  She is a nonsmoker and denies any illicit drug use.

 

                               PHYSICAL EXAMINATION

 

GENERAL:  She is moderately obese, otherwise healthy-looking white female.

 

VITAL SIGNS:  Blood pressure 160/100 (in pain), temperature 98.

 

LUNGS:  Clear.

 

HEART:  Regular and rhythmic.  No murmurs.

 

ABDOMEN:  Soft.  There is mild low left CVA tenderness.

 

 DIAGNOSTIC STUDIES/LAB DATA:  For evaluation of her recent colic, the patient 
had bilateral renal ultrasounds in my office at her visit on 19.  The 
study showed moderate left hydronephrosis and a 4 to 5 mm calculus in the 
distal left ureter, just proximal to the level of the ureteral orifice.  There 
were small bilateral ureteral jets.

 

Her urinalysis in my office showed +2 blood, was negative otherwise.

 

IMPRESSION:  Recurrent episodes of left renal colic secondary to a 5 mm 
calculus in the distal left ureter.

 

PLAN:  To continue with conservative management with pain medication.  She was 
also instructed to very carefully strain her urine for any stones.  If the 
stone has not passed before her planned admission on 19, then the plan is 
to proceed with left ureteroscopy, laser lithotripsy, and left ureteral stent 
insertion.

 

I discussed the above plan in detail with the patient.  All her questions were 
answered.

 

 

 

[id number]/[facility] #: [id number]

MICKY

## 2019-09-06 ENCOUNTER — HOSPITAL ENCOUNTER (OUTPATIENT)
Dept: HOSPITAL 25 - OR | Age: 69
Discharge: HOME | End: 2019-09-06
Attending: UROLOGY
Payer: MEDICARE

## 2019-09-06 VITALS — DIASTOLIC BLOOD PRESSURE: 70 MMHG | SYSTOLIC BLOOD PRESSURE: 112 MMHG

## 2019-09-06 DIAGNOSIS — N13.2: Primary | ICD-10-CM

## 2019-09-06 DIAGNOSIS — M19.90: ICD-10-CM

## 2019-09-06 DIAGNOSIS — E66.8: ICD-10-CM

## 2019-09-06 PROCEDURE — 74420 UROGRAPHY RTRGR +-KUB: CPT

## 2019-09-06 PROCEDURE — 88300 SURGICAL PATH GROSS: CPT

## 2019-09-06 PROCEDURE — 82365 CALCULUS SPECTROSCOPY: CPT

## 2019-09-06 PROCEDURE — C1876 STENT, NON-COA/NON-COV W/DEL: HCPCS

## 2019-09-06 NOTE — OP
DATE OF OPERATION:  09/06/19 - \[facility]\""

 

DATE OF BIRTH:  [birth year]

 

SURGEON:  Samir Mota MD

 

ANESTHESIOLOGIST:  Dr. Nicolas.

 

ANESTHESIA:  General.

 

PRE-OP DIAGNOSES:

1.  Distal left ureteral calculus.

2.  Recurrent episodes of left renal colic due to above.

 

POST-OP DIAGNOSES:

1.  Distal left ureteral calculus.

2.  Recurrent episodes of left renal colic due to above.

 

OPERATIVE PROCEDURE:

1.  Cystoscopy.

2.  Left ureteroscopy and laser lithotripsy of distal left ureteral calculus (6 
mm).

3.  Left retrograde pyelography.

4.  Insertion of left ureteral stent (6-Albanian).

 

INDICATION FOR PROCEDURE:  Ms. Suarez is a 68-year-old white female, who 
presented to the emergency room 5 days ago with symptoms of left renal colic 
and was noted on CT to have a 6 mm calculus in the distal left ureter.  She was 
managed conservatively; however, she continued to have recurrent episodes of 
acute left flank pain requiring the use of pain medications for control.

 

Because of the above history and persistent symptoms and after discussing the 
options of management, the patient wanted to proceed with endoscopic stone 
extraction.

 

PATHOLOGY:  At cystoscopy, the bladder mucosa looked normal.  There were no 
suspicious bladder lesions seen.  The ureteral orifices looked normal without 
any edema.  No calculi or diverticula were noted.

 

Upon left ureteroscopy, there was a 6 mm calculus that was impacted in the 
distal left ureter about 1.5 cm proximal to the orifice.  The calculus had the 
gross appearance of a calcium oxalate stone.  There was edema and hyperemia of 
the ureteral mucosa adjacent to the stone.  There was dilatation and some 
tortuosity of the ureter proximal to the obstruction and there was moderate 
left hydronephrosis.

 

DESCRIPTION OF PROCEDURE:  After successful general anesthesia, the patient was 
placed in the lithotomy position and was prepped and draped for a cystoscopy. 
Cystoscopy was performed, the bladder was carefully inspected and the above 
findings were noted.

 

A flexible-tip guidewire was then introduced into the left orifice and 
positioned in the area of the renal pelvis under fluoroscopy guidance.  A 6.5 
semi-rigid tapered ureteroscope was then introduced inside the bladder.  A 
flexible-tip basket was introduced through the port of the ureteroscope and its 
flexible tip was then introduced into the left ureter alongside the guidewire.  
That allowed the atraumatic introduction of the ureteroscope inside the ureter.
  The calculus was identified, was disimpacted, the basket was then deployed 
and the stone was engaged to prevent its proximal migration.

 

A size 550 micron laser fiber was then introduced into the other port of the 
ureteroscope.  Using the laser energy, the stone was then fragmented into 
several pieces.  The stone fragments were then extracted using the basket and 
sent for stone analysis.

 

Final inspection showed no residual calculi, no evidence of any ureteral wall 
injury.  The above-described edema and hyperemia of the ureteral mucosa 
secondary to the stone impaction were again noted.

 

Retrograde pyelography was then performed.  A size 6-Albanian stent was then 
placed with the proximal end coiling in the renal pelvis and the distal end 
coiling inside the bladder.  There was good drainage of contrast from the 
kidney and no extravasation.

 

The patient tolerated the procedure well and left the operating room in good 
condition.

 

The plan is to see the patient back in the office in about 10 days and the 
stent will be removed under local anesthesia.

 

 [id number] #: [id number]

MICKY